# Patient Record
Sex: MALE | Race: WHITE
[De-identification: names, ages, dates, MRNs, and addresses within clinical notes are randomized per-mention and may not be internally consistent; named-entity substitution may affect disease eponyms.]

---

## 2017-03-14 ENCOUNTER — HOSPITAL ENCOUNTER (EMERGENCY)
Dept: HOSPITAL 35 - ER | Age: 27
Discharge: HOME | End: 2017-03-14
Payer: COMMERCIAL

## 2017-03-14 VITALS — WEIGHT: 214.99 LBS | HEIGHT: 73 IN | BODY MASS INDEX: 28.49 KG/M2

## 2017-03-14 VITALS — DIASTOLIC BLOOD PRESSURE: 94 MMHG | SYSTOLIC BLOOD PRESSURE: 133 MMHG
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
## 2017-03-14 DIAGNOSIS — F41.0: ICD-10-CM

## 2017-03-14 DIAGNOSIS — M41.9: ICD-10-CM

## 2017-03-14 DIAGNOSIS — R09.1: ICD-10-CM
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
## 2017-03-14 DIAGNOSIS — F12.10: ICD-10-CM

## 2017-03-14 DIAGNOSIS — F17.210: ICD-10-CM
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
## 2017-03-14 DIAGNOSIS — G43.909: ICD-10-CM

## 2017-03-14 DIAGNOSIS — S61.102A: Primary | ICD-10-CM
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
## 2019-02-04 ENCOUNTER — HOSPITAL ENCOUNTER (EMERGENCY)
Dept: HOSPITAL 35 - ER | Age: 29
Discharge: HOME | End: 2019-02-04
Payer: COMMERCIAL

## 2019-02-04 VITALS — SYSTOLIC BLOOD PRESSURE: 130 MMHG | DIASTOLIC BLOOD PRESSURE: 86 MMHG

## 2019-02-04 VITALS — HEIGHT: 73 IN | BODY MASS INDEX: 30.88 KG/M2 | WEIGHT: 233.01 LBS

## 2019-02-04 DIAGNOSIS — F41.0: ICD-10-CM

## 2019-02-04 DIAGNOSIS — G43.909: ICD-10-CM

## 2019-02-04 DIAGNOSIS — F17.210: ICD-10-CM

## 2019-02-04 DIAGNOSIS — J06.9: Primary | ICD-10-CM

## 2019-02-04 DIAGNOSIS — M41.9: ICD-10-CM

## 2019-10-30 ENCOUNTER — HOSPITAL ENCOUNTER (INPATIENT)
Dept: HOSPITAL 35 - ER | Age: 29
LOS: 2 days | Discharge: HOME | DRG: 310 | End: 2019-11-01
Attending: HOSPITALIST | Admitting: HOSPITALIST
Payer: COMMERCIAL

## 2019-10-30 VITALS — SYSTOLIC BLOOD PRESSURE: 112 MMHG | DIASTOLIC BLOOD PRESSURE: 80 MMHG

## 2019-10-30 VITALS — DIASTOLIC BLOOD PRESSURE: 88 MMHG | SYSTOLIC BLOOD PRESSURE: 118 MMHG

## 2019-10-30 VITALS — HEIGHT: 72.99 IN | BODY MASS INDEX: 30.68 KG/M2 | WEIGHT: 231.5 LBS

## 2019-10-30 VITALS — SYSTOLIC BLOOD PRESSURE: 115 MMHG | DIASTOLIC BLOOD PRESSURE: 75 MMHG

## 2019-10-30 VITALS — DIASTOLIC BLOOD PRESSURE: 78 MMHG | SYSTOLIC BLOOD PRESSURE: 124 MMHG

## 2019-10-30 DIAGNOSIS — G43.909: ICD-10-CM

## 2019-10-30 DIAGNOSIS — F17.210: ICD-10-CM

## 2019-10-30 DIAGNOSIS — F41.9: ICD-10-CM

## 2019-10-30 DIAGNOSIS — Z71.6: ICD-10-CM

## 2019-10-30 DIAGNOSIS — M41.9: ICD-10-CM

## 2019-10-30 DIAGNOSIS — Z82.49: ICD-10-CM

## 2019-10-30 DIAGNOSIS — I48.91: Primary | ICD-10-CM

## 2019-10-30 LAB
ANION GAP SERPL CALC-SCNC: 10 MMOL/L (ref 7–16)
BASOPHILS NFR BLD AUTO: 0.7 % (ref 0–2)
BUN SERPL-MCNC: 14 MG/DL (ref 7–18)
CALCIUM SERPL-MCNC: 10.1 MG/DL (ref 8.5–10.1)
CHLORIDE SERPL-SCNC: 103 MMOL/L (ref 98–107)
CO2 SERPL-SCNC: 24 MMOL/L (ref 21–32)
CREAT SERPL-MCNC: 1 MG/DL (ref 0.7–1.3)
EOSINOPHIL NFR BLD: 3.8 % (ref 0–3)
ERYTHROCYTE [DISTWIDTH] IN BLOOD BY AUTOMATED COUNT: 13 % (ref 10.5–14.5)
GLUCOSE SERPL-MCNC: 92 MG/DL (ref 74–106)
GRANULOCYTES NFR BLD MANUAL: 54.1 % (ref 36–66)
HCT VFR BLD CALC: 49 % (ref 42–52)
HGB BLD-MCNC: 17.1 GM/DL (ref 14–18)
LYMPHOCYTES NFR BLD AUTO: 33.2 % (ref 24–44)
MAGNESIUM SERPL-MCNC: 2 MG/DL (ref 1.8–2.4)
MCH RBC QN AUTO: 30.2 PG (ref 26–34)
MCHC RBC AUTO-ENTMCNC: 34.9 G/DL (ref 28–37)
MCV RBC: 86.6 FL (ref 80–100)
MONOCYTES NFR BLD: 8.2 % (ref 1–8)
NEUTROPHILS # BLD: 3.6 THOU/UL (ref 1.4–8.2)
PLATELET # BLD: 222 THOU/UL (ref 150–400)
POTASSIUM SERPL-SCNC: 3.9 MMOL/L (ref 3.5–5.1)
RBC # BLD AUTO: 5.65 MIL/UL (ref 4.5–6)
SODIUM SERPL-SCNC: 137 MMOL/L (ref 136–145)
TROPONIN I SERPL-MCNC: <0.06 NG/ML (ref ?–0.06)
WBC # BLD AUTO: 6.6 THOU/UL (ref 4–11)

## 2019-10-30 PROCEDURE — 10879: CPT

## 2019-10-31 VITALS — SYSTOLIC BLOOD PRESSURE: 101 MMHG | DIASTOLIC BLOOD PRESSURE: 60 MMHG

## 2019-10-31 VITALS — SYSTOLIC BLOOD PRESSURE: 122 MMHG | DIASTOLIC BLOOD PRESSURE: 80 MMHG

## 2019-10-31 VITALS — SYSTOLIC BLOOD PRESSURE: 96 MMHG | DIASTOLIC BLOOD PRESSURE: 70 MMHG

## 2019-10-31 VITALS — DIASTOLIC BLOOD PRESSURE: 60 MMHG | SYSTOLIC BLOOD PRESSURE: 112 MMHG

## 2019-10-31 LAB
ANION GAP SERPL CALC-SCNC: 10 MMOL/L (ref 7–16)
BUN SERPL-MCNC: 15 MG/DL (ref 7–18)
CALCIUM SERPL-MCNC: 9.6 MG/DL (ref 8.5–10.1)
CHLORIDE SERPL-SCNC: 103 MMOL/L (ref 98–107)
CO2 SERPL-SCNC: 26 MMOL/L (ref 21–32)
CREAT SERPL-MCNC: 1 MG/DL (ref 0.7–1.3)
ERYTHROCYTE [DISTWIDTH] IN BLOOD BY AUTOMATED COUNT: 13.3 % (ref 10.5–14.5)
GLUCOSE SERPL-MCNC: 91 MG/DL (ref 74–106)
HCT VFR BLD CALC: 52.7 % (ref 42–52)
HGB BLD-MCNC: 17.9 GM/DL (ref 14–18)
MAGNESIUM SERPL-MCNC: 2.1 MG/DL (ref 1.8–2.4)
MCH RBC QN AUTO: 29.8 PG (ref 26–34)
MCHC RBC AUTO-ENTMCNC: 34 G/DL (ref 28–37)
MCV RBC: 87.6 FL (ref 80–100)
PLATELET # BLD: 236 THOU/UL (ref 150–400)
POTASSIUM SERPL-SCNC: 4.5 MMOL/L (ref 3.5–5.1)
RBC # BLD AUTO: 6.01 MIL/UL (ref 4.5–6)
SODIUM SERPL-SCNC: 139 MMOL/L (ref 136–145)
WBC # BLD AUTO: 8.8 THOU/UL (ref 4–11)

## 2019-10-31 NOTE — 2DMMODE
[facility] [employer]
[place]  [address]
Phone:  [phone] 2 D/M-MODE ECHOCARDIOGRAM     
_______________________________________________________________________________
 
Name:            BLAINEYOANDY GALILEO           Room #:        349-I       ADM IN
.R.#:           [id number]          Account #:     [id number]  
Admission:       10/30/19         Attend Phys:   Compa Scales,
Discharge:                  Date of Birth: [birth year]  
                         Report #:      [id number]
        [id number]
_______________________________________________________________________________
THIS REPORT FOR:   //name//                          
 
 
--------------- APPROVED REPORT --------------
 
 
Study performed:  10/31/2019 08:59:12
 
EXAM: Comprehensive 2D, Doppler, and color-flow 
Echocardiogram 
Patient Location: Bedside   
Room #:  349     Status:  routine
 
       BSA:         2.27
HR: 104 bpm   BP:          96/70 mmHg 
Rhythm: Atrial Fibrillation    
 
Other Information 
Study Quality: Adequate
 
Risk Factors: 
Cardiac Risk Factors:  Smoking
 
Indications
Atrial Fibrillation
Dyspnea 
Chest Pain
 
2D Dimensions
IVSd:  14.46 (7-11mm)  LVOT Diam:  21.00 (18-24mm) 
LVDd:  41.70 mm   
PWd:  13.47 (7-11mm)  Ascending Ao:  27.23 (22-36mm)
LVDs:  26.70 (25-40mm)  
Aortic Root:  30.64 mm  
LV Single Plane 4CH:  51.54 % 
LV Single Plane 2CH:  55.40 % 
Biplane EF:  50.6 %  
 
Volumes
Left Atrial Volume (Systole) 
Single Plane 4CH:  46.39 mL Single Plane 2CH:  46.95 mL
    LA ESV Index:  23.00 mL/m2
 
Aortic Valve
AoV Peak Jamar.:  1.06 m/s 
AO Peak Gr.:  4.49 mmHg  LVOT Max P.54 mmHg
 
 
[facility]
[address]
[place]  [address]
Phone:  [phone] 2 D/M-MODE ECHOCARDIOGRAM     
_______________________________________________________________________________
 
Name:            BLAINEYOANDY GURROLA           Room #:        349-I       [facility] [place]
[facility].#:           [id number]          Account #:     [id number]  
Admission:       10/30/19         Attend Phys:   Compa Scales,
Discharge:                  Date of Birth: [birth year]  
                         Report #:      [id number]
        [id number]
_______________________________________________________________________________
    LVOT Max V:  0.80 m/s
GILBERTO Vmax: 2.67 cm2  
 
Pulmonary Valve
PV Peak Jamar.:  0.73 m/s PV Peak Gr.:  2.14 mmHg
 
Tricuspid Valve
 RAP Estimate:  7.00 mmHg
 
Left Ventricle
The left ventricle is normal size. There is normal LV segmental wall 
motion. Moderate concentric left ventricular hypertrophy. Left 
ventricular systolic function is normal. The left ventricular 
ejection fraction is within the normal range. LVEF is 55-60%. This 
study is not technically sufficient to allow evaluation of the LV 
diastolic function due to atrial fibrillation.
 
Right Ventricle
The right ventricle is normal size. The right ventricular systolic 
function is normal.
 
Atria
The left atrium size is normal. The right atrium size is 
normal.
 
Aortic Valve
The aortic valve is normal in structure. No aortic regurgitation is 
present. There is no aortic valvular stenosis.
 
Mitral Valve
The mitral valve is normal in structure. There is no mitral valve 
regurgitation noted. No evidence of mitral valve stenosis.
 
Tricuspid Valve
The tricuspid valve is normal in structure. There is no tricuspid 
valve regurgitation noted.
 
Pulmonic Valve
The pulmonary valve is normal in structure. There is no pulmonic 
valvular regurgitation.
 
Great Vessels
The aortic root is normal in size. The ascending aorta is normal in 
size. IVC is normal in size and collapses >50% with 
inspiration.
 
 
 
[facility]
[address]
[place]  [address]
Phone:  [phone]                    2 D/M-MODE ECHOCARDIOGRAM     
_______________________________________________________________________________
 
Name:            EVERTON VALLADARESHAN GALILEO           Room #:        349-I       ADM IN
M.R.#:           [id number]          Account #:     [id number]  
Admission:       10/30/19         Attend Phys:   Compa Scales,
Discharge:                  Date of Birth: [birth year]  
                         Report #:      [id number]
        [id number]
_______________________________________________________________________________
Pericardium
There is no pericardial effusion.
 
<Conclusion>
Left ventricular systolic function is normal.
There is normal LV segmental wall motion.
Moderate concentric left ventricular hypertrophy.
LVEF is 55-60%.
The aortic valve is normal in structure. No aortic regurgitation or 
stenosis.
The mitral valve is normal in structure. No mitral valve 
regurgitation
Pulmonary artery pressure could not be reliably ascertained
There is no pericardial effusion.
 
 
 
 
 
 
 
 
 
 
 
 
 
 
 
 
 
 
 
 
 
 
 
 
 
 
 
 
 
 
  <ELECTRONICALLY SIGNED>
   By: Brent Andersen MD, [facility]   
  10/31/19     0952
D: 10/31/19 0952                           _____________________________________
T: 10/31/19 0952                           Brent Andersen MD, FACC     /INF

## 2019-10-31 NOTE — EKG
[address] [employer]
[place]  [address]
Phone:  [phone]                    ELECTROCARDIOGRAM REPORT      
_______________________________________________________________________________
 
Name:       YOANDY VALLADARES           Room #:         349-I       ADM IN  
M.R.#:      [id number]     Account #:      [id number]  
Admission:  10/30/19    Attend Phys:    Compa Scales MD 
Discharge:              Date of Birth:  [birth year]  
                                                          Report #: [id number]
   [id number]
_______________________________________________________________________________
THIS REPORT FOR:   //name//                          
 
                          [facility]
                                       
Test Date:    2019-10-31               Test Time:    08:11:21
Pat Name:     YOANDY VALLADARES            Department:   
Patient ID:   [id number]            Room:         349 I
Gender:       M                        Technician:   MALIK
:          1990               Requested By: Rubina Camacho
Order Number: [id number] MD:   Brent Andersen
                                 Measurements
Intervals                              Axis          
Rate:         112                      P:            
NH:                                    QRS:          93
QRSD:         97                       T:            -12
QT:           377                                    
QTc:          515                                    
                           Interpretive Statements
Atrial fibrillation
Borderline right axis deviation
Borderline T abnormalities, inferior leads
Compared to ECG 10/30/2019 09:44:25
No significant changes
 
Electronically Signed On 10- 8:50:17 CDT by Brent Andersen
[email or web address]
 
 
 
 
 
 
 
 
 
 
 
 
 
 
 
 
 
  <ELECTRONICALLY SIGNED>
   By: Brent Andersen MD, [facility]   
  10/31/19     0850
D: 10/31/19 0811                           _____________________________________
T: 10/31/19 0811                           Brent Andersen MD, [facility]     /EPI

## 2019-10-31 NOTE — NUR
PT ARRIVED FROM ER VIA CART, PLACED IN ROOM 349.  ADMISSION ASSESSMENTS
COMPLETED.  PT DENIES ANY CHEST PAIN OR SOA UPON ARRIVAL OR OVERNIGHT.  PT
HEART RATE 'S AND IN AFIB/FLUTTER.  TACHYCARDIC WITH MOVEMENT AND
ACTIVITY WITH OCCASIONAL SPIKES INTO 130-150'S.  REST WOULD ALLOW HEART RATE
TO TREND BACK 'S RAPIDLY.  GIVEN ONE DOSE OF LOPRESSOR IV THIS AM AS
HEART RATE WAS FRUQUENTLY TRENDING ABOVE 120 EVEN WITH MINIMAL EXERTION SUCH
AS TURNING OVER IN BED AND SPEAKING.  SEE MAR.

## 2019-10-31 NOTE — NUR
Assumed care approx. 0700 this AM. Patient's heart rate has ranged anywhere
from the 90's to 150. Patient in afib and never noted to convert to sinus
rhythm. Onetime dose of IV digoxin administered today. Ativan IV given for
anxiety/agitation due to withdrawals from cigarettes. Pt refusing a nicotene
patch, and refusing to listen to education ways to stop smoking and the
importance. Cardiology saw patient today, and changed doses of
medication-Lovenox dc'd and an oral anticoagulant is now on board. Pt hopeful
for DC tomorrow if HR is controlled.
Pt slowly progressing toward plan of care goals as heart rate has been in the
90's to 120 before shift change.

## 2019-11-01 VITALS — DIASTOLIC BLOOD PRESSURE: 66 MMHG | SYSTOLIC BLOOD PRESSURE: 104 MMHG

## 2019-11-01 VITALS — DIASTOLIC BLOOD PRESSURE: 75 MMHG | SYSTOLIC BLOOD PRESSURE: 97 MMHG

## 2019-11-01 VITALS — DIASTOLIC BLOOD PRESSURE: 73 MMHG | SYSTOLIC BLOOD PRESSURE: 119 MMHG

## 2019-11-01 VITALS — SYSTOLIC BLOOD PRESSURE: 97 MMHG | DIASTOLIC BLOOD PRESSURE: 75 MMHG

## 2019-11-01 NOTE — NUR
PT A/0 X4 AND AD CHRISTOPHER.  PT HAS HIGH ANXIETY, STARTED IV ATIVAN PRN.  VSS WHILE
HR RESTING IS BELOW 100.  WHEN AMBULATING TO BATHROOM HR ELEVATES 'S.
AFIB TO AFLUTTER SHOWING ON TELE.  PT COMPLAINS THAT IV IS INSERTED WRONG, DUE
TO "GIVING PLASMA FOR YEARS." IV SITE FLUSHES AND IS PATENT.  PT SHOULD DC
TODAY IF HR WILL REMAIN REGULAR RHYTHM AND NOT TACHY.

## 2019-11-01 NOTE — NUR
DISCHARGE NOTE:
SW reviewed chart and spoke with nursing and attending physician. Pt is
medically stable for discharge home today. Pt to be started on Xarelto.
Cardiology provided pt with samples. SW met with pt at bedside. Provided pt
with  Health Resource Guide and prescription discount cards. PT has
transportation home. No additional SW needs identified at this time, but is
available to assist should needs arise.

## 2019-11-03 NOTE — EKG
[address]  [address]
Phone:  [phone]                    ELECTROCARDIOGRAM REPORT      
_______________________________________________________________________________
 
Name:       VALLADARESYOANDY GALILEO           Room #:         349-I       DIS IN  
M.R.#:      [id number]     Account #:      [id number]  
Admission:  10/30/19    Attend Phys:    Compa Scales MD 
Discharge:  19    Date of Birth:  [birth year]  
                                                          Report #: [id number]
   [id number]
_______________________________________________________________________________
THIS REPORT FOR:   //name//                          
 
                          [facility]
                                       
Test Date:    2019               Test Time:    07:59:41
Pat Name:     YOANDY VALLADARES            Department:   
Patient ID:   [id number]            Room:         [facility]
Gender:       M                        Technician:   JUANAING
:          1990               Requested By: Rubina Camacho
Order Number: [id number] MD:   Domo Hilton
                                 Measurements
Intervals                              Axis          
Rate:         108                      P:            
IL:                                    QRS:          73
QRSD:         99                       T:            -9
QT:           347                                    
QTc:          465                                    
                           Interpretive Statements
Atrial fibrillation
Borderline T abnormalities, inferior leads
Compared to ECG 10/31/2019 08:11:21
No significant changes
 
Electronically Signed On 11-3-2019 11:15:11 CST by Domo Hilton
[email or web address]
 
 
 
 
 
 
 
 
 
 
 
 
 
 
 
 
 
 
  <ELECTRONICALLY SIGNED>
   By: Domo Hilton MD        
  19     1115
D: 19 0759                           _____________________________________
T: 19 0759                           Domo Hilton MD          /EPI

## 2020-12-20 ENCOUNTER — HOSPITAL ENCOUNTER (EMERGENCY)
Dept: HOSPITAL 35 - ER | Age: 30
Discharge: HOME | End: 2020-12-20
Payer: COMMERCIAL

## 2020-12-20 VITALS — WEIGHT: 220 LBS | HEIGHT: 73 IN | BODY MASS INDEX: 29.16 KG/M2

## 2020-12-20 VITALS — DIASTOLIC BLOOD PRESSURE: 92 MMHG | SYSTOLIC BLOOD PRESSURE: 157 MMHG

## 2020-12-20 DIAGNOSIS — R06.02: ICD-10-CM

## 2020-12-20 DIAGNOSIS — R11.0: ICD-10-CM

## 2020-12-20 DIAGNOSIS — F41.0: ICD-10-CM

## 2020-12-20 DIAGNOSIS — R10.9: ICD-10-CM

## 2020-12-20 DIAGNOSIS — T69.9XXA: Primary | ICD-10-CM

## 2020-12-20 DIAGNOSIS — G43.909: ICD-10-CM

## 2020-12-20 DIAGNOSIS — F17.210: ICD-10-CM

## 2021-11-20 ENCOUNTER — HOSPITAL ENCOUNTER (EMERGENCY)
Dept: HOSPITAL 35 - ER | Age: 31
Discharge: HOME | End: 2021-11-20
Payer: COMMERCIAL

## 2021-11-20 VITALS — BODY MASS INDEX: 29.55 KG/M2 | WEIGHT: 223 LBS | HEIGHT: 73 IN

## 2021-11-20 VITALS — SYSTOLIC BLOOD PRESSURE: 140 MMHG | DIASTOLIC BLOOD PRESSURE: 82 MMHG

## 2021-11-20 DIAGNOSIS — F12.90: ICD-10-CM

## 2021-11-20 DIAGNOSIS — F17.210: ICD-10-CM

## 2021-11-20 DIAGNOSIS — Z20.822: ICD-10-CM

## 2021-11-20 DIAGNOSIS — G43.909: ICD-10-CM

## 2021-11-20 DIAGNOSIS — J06.9: Primary | ICD-10-CM

## 2021-12-23 ENCOUNTER — HOSPITAL ENCOUNTER (EMERGENCY)
Dept: HOSPITAL 35 - ER | Age: 31
Discharge: HOME | End: 2021-12-23
Payer: COMMERCIAL

## 2021-12-23 VITALS — SYSTOLIC BLOOD PRESSURE: 128 MMHG | DIASTOLIC BLOOD PRESSURE: 88 MMHG

## 2021-12-23 VITALS — WEIGHT: 225 LBS | HEIGHT: 73 IN | BODY MASS INDEX: 29.82 KG/M2

## 2021-12-23 DIAGNOSIS — F17.210: ICD-10-CM

## 2021-12-23 DIAGNOSIS — F12.90: ICD-10-CM

## 2021-12-23 DIAGNOSIS — K64.4: Primary | ICD-10-CM

## 2021-12-23 DIAGNOSIS — G35: ICD-10-CM

## 2021-12-23 DIAGNOSIS — G43.909: ICD-10-CM

## 2021-12-26 ENCOUNTER — HOSPITAL ENCOUNTER (EMERGENCY)
Dept: HOSPITAL 35 - ER | Age: 31
Discharge: HOME | End: 2021-12-26
Payer: COMMERCIAL

## 2021-12-26 VITALS — WEIGHT: 225 LBS | HEIGHT: 73 IN | BODY MASS INDEX: 29.82 KG/M2

## 2021-12-26 VITALS — SYSTOLIC BLOOD PRESSURE: 144 MMHG | DIASTOLIC BLOOD PRESSURE: 88 MMHG

## 2021-12-26 DIAGNOSIS — F17.210: ICD-10-CM

## 2021-12-26 DIAGNOSIS — K64.4: Primary | ICD-10-CM

## 2021-12-26 DIAGNOSIS — G43.909: ICD-10-CM

## 2021-12-26 DIAGNOSIS — F12.90: ICD-10-CM

## 2021-12-26 LAB
ERYTHROCYTE [DISTWIDTH] IN BLOOD BY AUTOMATED COUNT: 13.2 % (ref 10.5–14.5)
HCT VFR BLD CALC: 47.6 % (ref 42–52)
HGB BLD-MCNC: 16.3 GM/DL (ref 14–18)
MCH RBC QN AUTO: 30.6 PG (ref 26–34)
MCHC RBC AUTO-ENTMCNC: 34.2 G/DL (ref 28–37)
MCV RBC: 89.6 FL (ref 80–100)
PLATELET # BLD: 245 THOU/UL (ref 150–400)
RBC # BLD AUTO: 5.31 MIL/UL (ref 4.5–6)
WBC # BLD AUTO: 9.6 THOU/UL (ref 4–11)

## 2022-01-15 ENCOUNTER — HOSPITAL ENCOUNTER (EMERGENCY)
Dept: HOSPITAL 35 - ER | Age: 32
Discharge: HOME | End: 2022-01-15
Payer: COMMERCIAL

## 2022-01-15 VITALS — WEIGHT: 230.01 LBS | BODY MASS INDEX: 30.48 KG/M2 | HEIGHT: 73 IN

## 2022-01-15 VITALS — DIASTOLIC BLOOD PRESSURE: 77 MMHG | SYSTOLIC BLOOD PRESSURE: 136 MMHG

## 2022-01-15 DIAGNOSIS — F17.210: ICD-10-CM

## 2022-01-15 DIAGNOSIS — G43.909: ICD-10-CM

## 2022-01-15 DIAGNOSIS — U07.1: Primary | ICD-10-CM

## 2023-06-23 NOTE — NUR
Diarrhea, unspecified type  -     POCT Strep A, Molecular    -     dicyclomine tablet 20 mg - given in clinic @ 3:00 pm    -     dicyclomine (BENTYL) 10 MG capsule; Take 1 capsule (10 mg total) by mouth 4 (four) times daily before meals and nightly. for 5 days  Dispense: 20 capsule; Refill: 0      - Take OTC Pepto Bismol for abdominal discomfort.  Avoid Imodium.    - Drink plenty of electrolytes and fluids.      -  [place] diet.      If you eat, avoid fatty, greasy, spicy, or fried foods.    Do not eat dairy products if you have diarrhea; they can make the diarrhea worse    ED Precautions  If your symptoms worsen or fail to improve you should go to Emergency Department.     Watch for any increase pain, fever, localized pain to right lower abdomen, continued vomiting or diarrhea, not urinating, or blood in the stool.     INITIAL ASSESSMENT:
SW reviewed chart and spoke with nursing and attending physician. Pt was
admitted from home due to a-fib with RVR. Cardiology consulted. Pt to have
echo today. SW met with pt at bedside. Introduced role of SW. Pt is
alert/orientated x 4. Pt reports he lives at home with family. Prior to
admission, pt was independent with ADLs. Pt does not use any DME. Pt does not
have health insurance. SW discussed referral to [facility] for possible Medicaid
application and/or financial assistance. Pt does not have a PCP. SW to provide
pt with  Health Resource Guide and prescription discount card. Pt will
discharge home when medically stable. SW is following to assist as needed with
discharge planning.